# Patient Record
Sex: FEMALE | Race: WHITE | NOT HISPANIC OR LATINO | Employment: PART TIME | URBAN - METROPOLITAN AREA
[De-identification: names, ages, dates, MRNs, and addresses within clinical notes are randomized per-mention and may not be internally consistent; named-entity substitution may affect disease eponyms.]

---

## 2017-11-06 ENCOUNTER — OFFICE VISIT (OUTPATIENT)
Dept: OPTOMETRY | Facility: CLINIC | Age: 57
End: 2017-11-06
Payer: COMMERCIAL

## 2017-11-06 DIAGNOSIS — H16.202 KERATOCONJUNCTIVITIS OF LEFT EYE: Primary | ICD-10-CM

## 2017-11-06 DIAGNOSIS — H53.8 BLURRED VISION, LEFT EYE: ICD-10-CM

## 2017-11-06 PROCEDURE — 92012 INTRM OPH EXAM EST PATIENT: CPT | Mod: S$GLB,,, | Performed by: OPTOMETRIST

## 2017-11-06 PROCEDURE — 99999 PR PBB SHADOW E&M-EST. PATIENT-LVL II: CPT | Mod: PBBFAC,,, | Performed by: OPTOMETRIST

## 2017-11-07 NOTE — PROGRESS NOTES
HPI     Follow-up    Additional comments: Itchy watery pink OS since 10/30/2017, she went to   urgent care on Tuesday and was told it was an allergy-related problem -   doctor prescribed TobraDex (generic) drops for use in OS TID.  Helped a little.  States she started wearing CL again on 11/02/2017            Comments   Patient's age: 57 y.o WF   Approximate date of last eye examination:  7/6/2015  Name of last eye doctor seen: Dr Curtis  City/State: [facility]  Wears glasses? Yes     If yes, wears  Full-time or part-time?  Part time after removing   contacts  Present glasses are: Bifocal, SV Distance, SV Reading?  Bifocals  Approximate age of present glasses:  3+ years   Got new glasses following last exam, or subsequently?:  No   Any problem with VA with glasses ?  No  Wears CLs?:  Yes           If yes:              Type of CL worn:    AnchorFree 55 8.8 14.2         OD    +4.00          OS   +1.50                         Wears full-time or part-time:  Full time               Sleeps with contact lens es:  No               CL Solution used:  Opti-free               How often replace CLs:  2-3 weeks              Any problem with VA with CLs?  No              Has patient read and signed the contact lens fee information   document?  Yes  Headaches?  No  Eye pain/discomfort?  No                                                                                       Flashes?  No  Floaters?  No  Diplopia/Double vision?  No  Patient's Ocular History:         Any eye ayers rgeries? No         Any eye injury?  No         Any treatment for eye disease?  No  Family history of eye disease?    Maternal Grandmother possible glaucoma  Significant patient medical history:              1. Diabetes?  No              2. HBP?   No              3. Other (describe):  None, Healthy    ! OTC eyedrops currently using:  Rewetting drops as needed   ! Prescription eye meds currently using:  None   ! Any history of allergy/adverse reaction to any eye  meds used p   reviously?  No    ! Any history of allergy/adverse reaction to eyedrops used during prior   eye exam(s)? No    ! Any history of allergy/adverse reaction to Novacaine or similar meds?   No   ! Any history of allergy/adverse reaction to  Epinephrine or similar   meds? No           ! Patient okay with use of anesthetic eyedrops to check eye pressure?    Yes        ! Patient okay with use of eyedrops to dilate pupils today?  Yes   !  Allergies/Medications/Medical History/Fami ly History reviewed today?      Yes          Last edited by Tejas Curtis, OD on 11/6/2017  2:13 PM. (History)            Assessment /Plan     For exam results, see Encounter Report.    1. Keratoconjunctivitis of left eye     2. Blurred vision, left eye                      SCL wearer presents today with signs/symptoms suggestive of keratoconjuctivitis in the left eye only, of undetermined origin.   No signs/symptoms in the right eye.  Removed and discarded SCL worn in the left eye.  Ms. Liang already has tobramycin/dexamethasone drops previously prescribed by physician at urgent care clinic.  Remain out of CLs, at least in the left eye.  Use tobramycin/dexamethasone drops in the left eye every four hours while awake until seen again.  Return on 11/10/2017 for recheck - but call/return sooner if notes any apparent exacerbation of signs/symptoms in the interim.

## 2017-11-07 NOTE — PATIENT INSTRUCTIONS
SCL wearer presents today with signs/symptoms suggestive of keratoconjuctivitis in the left eye only, of undetermined origin.   No signs/symptoms in the right eye.  Removed and discarded SCL worn in the left eye.  Ms. Liang already has tobramycin/dexamethasone drops previously prescribed by physician at urgent care clinic.  Remain out of CLs, at least in the left eye.  Use tobramycin/dexamethasone drops in the left eye every four hours while awake until seen again.  Return on 11/10/2017 for recheck - but call/return sooner if notes any apparent exacerbation of signs/symptoms in the interim.

## 2017-11-10 ENCOUNTER — OFFICE VISIT (OUTPATIENT)
Dept: OPTOMETRY | Facility: CLINIC | Age: 57
End: 2017-11-10

## 2017-11-10 DIAGNOSIS — H16.202 KERATOCONJUNCTIVITIS OF LEFT EYE: Primary | ICD-10-CM

## 2017-11-10 PROCEDURE — 99499 UNLISTED E&M SERVICE: CPT | Mod: S$GLB,,, | Performed by: OPTOMETRIST

## 2017-11-10 RX ORDER — TOBRAMYCIN AND DEXAMETHASONE 3; 1 MG/ML; MG/ML
1-2 SUSPENSION/ DROPS OPHTHALMIC
Qty: 5 ML | Refills: 0 | Status: SHIPPED | OUTPATIENT
Start: 2017-11-10 | End: 2017-11-20

## 2017-11-10 NOTE — PATIENT INSTRUCTIONS
Recent diagnosis of keratoconjunctivitis of the left eye in SCL wearer.  Signs/symptoms improving, but central corneal changes persist, with secondary decrease in VA in the left eye.  No problem noted in the right eye.  Remain out of CLs  Continue tobramycin/dexamethasone drops in the left eye (every four hours while awake).    Recheck in one week - or prior if she notes any exacerbation of signs/symptoms in the interim.

## 2017-11-12 NOTE — PROGRESS NOTES
HPI     Eye Problem    Additional comments: Follow up on keratoconjuctivitis of the left eye.  States that signs/symptoms much improved.  No longer symptomatic.   Using tobramycin/dexamethasone drops four times per day since last visit.  Has not worn CLs.            Comments   Patient in for progress check.    Being followed for (diagnosis):  Follow up on keratoconjuncitivitis in   left eye    Date last seen:  11/06/2017    Doctor last seen:  Dr. Curtis    Prescribed eye medications(s) using:  Tobramycin/dexatmethasone ophthalmic   solution four times per day in the left eye    OTC eye medication(s) using:  no    Signs/symptoms of condition resolved/better/stable/worse?:  Much better -   asymptomatic     Allergies/Medications reviewed today?  yes               Last edited by Tejas Curtis, OD on 11/10/2017 12:55 PM. (History)            Assessment /Plan     For exam results, see Encounter Report.    1. Keratoconjunctivitis of left eye  tobramycin-dexamethasone 0.3-0.1% (TOBRADEX) 0.3-0.1 % DrpS                  Recent diagnosis of keratoconjunctivitis of the left eye in SCL wearer.  Signs/symptoms improving, but central corneal changes persist, with secondary decrease in VA in the left eye.  No problem noted in the right eye.  Remain out of CLs  Continue tobramycin/dexamethasone drops in the left eye (every four hours while awake).    Recheck in one week - or prior if she notes any exacerbation of signs/symptoms in the interim.

## 2017-11-17 ENCOUNTER — OFFICE VISIT (OUTPATIENT)
Dept: OPTOMETRY | Facility: CLINIC | Age: 57
End: 2017-11-17
Payer: COMMERCIAL

## 2017-11-17 DIAGNOSIS — H16.202 KERATOCONJUNCTIVITIS OF LEFT EYE: Primary | ICD-10-CM

## 2017-11-17 PROCEDURE — 99499 UNLISTED E&M SERVICE: CPT | Mod: S$GLB,,, | Performed by: OPTOMETRIST

## 2017-11-17 PROCEDURE — 99999 PR PBB SHADOW E&M-EST. PATIENT-LVL II: CPT | Mod: PBBFAC,,, | Performed by: OPTOMETRIST

## 2017-11-17 NOTE — PROGRESS NOTES
"HPI     Eye Problem    Additional comments: Follow up on keratoconjunctivitis in OS.  Signs and symptoms much improved.  Needs updated spectacle lens Rx, as she will be without CLs for a while.            Comments   Patient in for progress check     Being followed for (diagnosis):   Keratoconjunctivitis in OS    Date last seen:  11/10/2017    Doctor last seen:  Dr. Curtis    Prescribed eye medications(s) using:  Tobramycin/dexamethasone    OTC eye medication(s) using:  none    Signs/symptoms of condition resolved/better/stable/worse?:  Much better   per patient.    Allergies/Medications reviewed today?  Yes    PD 62/58  Reading distance = 14.5"                Last edited by Tejas Curtis, OD on 11/17/2017 12:30 PM. (History)            Assessment /Plan     For exam results, see Encounter Report.    1. Keratoconjunctivitis of left eye                Recent diagnosis of keratoconjunctivitis of the left eye in SCL wearer.  Signs/symptoms much improved with only minimal superficial corneal changes in left eye.  No problem noted in the right eye.  Refraction done.    Hyperopia with slight astigmatism in the right eye, and hyperopia in left eye.  Presbyopia consistent with age.  New spectacle lens Rx issued.   Remain out of CLs  Continue tobramycin/dexamethasone drops in the left eye, but reduce usage to twice per day until out.   Recheck 11/30/2017 - or prior if she notes any exacerbation of signs/symptoms in the interim.            "

## 2017-11-30 ENCOUNTER — OFFICE VISIT (OUTPATIENT)
Dept: OPTOMETRY | Facility: CLINIC | Age: 57
End: 2017-11-30
Payer: COMMERCIAL

## 2017-11-30 DIAGNOSIS — Z13.5 SCREENING FOR EYE CONDITION: ICD-10-CM

## 2017-11-30 DIAGNOSIS — H16.9 KERATITIS: Primary | ICD-10-CM

## 2017-11-30 PROCEDURE — 99999 PR PBB SHADOW E&M-EST. PATIENT-LVL II: CPT | Mod: PBBFAC,,, | Performed by: OPTOMETRIST

## 2017-11-30 PROCEDURE — 92014 COMPRE OPH EXAM EST PT 1/>: CPT | Mod: S$GLB,,, | Performed by: OPTOMETRIST

## 2017-11-30 RX ORDER — PREDNISOLONE ACETATE 10 MG/ML
1 SUSPENSION/ DROPS OPHTHALMIC 4 TIMES DAILY
Qty: 5 ML | Refills: 0 | Status: SHIPPED | OUTPATIENT
Start: 2017-11-30 | End: 2018-12-10

## 2017-11-30 NOTE — PATIENT INSTRUCTIONS
Persistent keratitis of left eye, in soft contact lens wearer.  Mrs. Liang no longer using tobramycin/dexamethesone drops previously prescribed on the left eye.  Only mildly symptomatic (once within the last few days, while cutting grass).    VA with last refraction (done last visit) very satisfactory in each eye.   Remain out of CLs until cornea of left eye clear.   When okay to resume CL wear, Mrs. Liang desires to switch to daily disposable SCLs.    Prescribed prednisolone acetate ophthalmic suspension for use in the left eye every four hours while awake until seen again.  Return on 12 /07/2017 for recheck.

## 2017-11-30 NOTE — PROGRESS NOTES
"HPI     Eye Problem    Additional comments: Follow up on recent history of keratoconjunctivits   in the left eye.  No longer using medication, and both eyes feel fine.    Refraction done on last visit.  Has not yet gotten new glasses.  Anxious   to resume CL wear.            Comments   Patient in for progress check.    Being followed for (diagnosis):   Keratoconjunctivitis of left eye.   SCL   wearer, but has not worn CLs in several weeks.    Date last seen:  11/17/2017 refraction done then, and new spectacle lens   Rx issued then    Doctor last seen:  Dr. Curtis    Prescribed eye medications(s) using: none    OTC eye medication(s) using:  none    Signs/symptoms of condition resolved/better/stable/worse?:  Better - both   eyes feel fine, and anxious to resume CL wear, if possible.     Allergies/Medications reviewed today?  yes      Last refraction done 11/17/2017:     OD  +1.75 +0.50 x 140   20/20-1     OS  +1.50 DS   20/20        Binoc subj at 14.5" = +2.50 D add OU     Placed above MR into phoropter.  Distance VA with Rx:  OD  20/20-2                                      OS   20/25-1+3    Last CL Rx written for monovision Proclear DW SCLs:  Right for near and   left for distance  However, Mrs. Liang would like to switch to monovision  daily   disposable SCLs                                                                         Last edited by Tejas Curtis, OD on 11/30/2017  8:05 AM. (History)            Assessment /Plan     For exam results, see Encounter Report.    1. Keratitis     2. Screening for eye condition                  Persistent keratitis of left eye, in soft contact lens wearer.  Mrs. Liang no longer using tobramycin/dexamethesone drops previously prescribed on the left eye.  Only mildly symptomatic (once within the last few days, while cutting grass).    VA with last refraction (done last visit) very satisfactory in each eye.   Remain out of CLs until cornea of left eye clear. "   When okay to resume CL wear, Mrs. Liang desires to switch to daily disposable SCLs.    Prescribed prednisolone acetate ophthalmic suspension for use in the left eye every four hours while awake until seen again.  Return on 12 /07/2017 for recheck.

## 2017-12-07 ENCOUNTER — OFFICE VISIT (OUTPATIENT)
Dept: OPTOMETRY | Facility: CLINIC | Age: 57
End: 2017-12-07
Payer: COMMERCIAL

## 2017-12-07 DIAGNOSIS — Z46.0 ENCOUNTER FOR FITTING OR ADJUSTMENT OF SPECTACLES OR CONTACT LENSES: Primary | ICD-10-CM

## 2017-12-07 PROCEDURE — 99499 UNLISTED E&M SERVICE: CPT | Mod: S$GLB,,, | Performed by: OPTOMETRIST

## 2017-12-07 NOTE — PATIENT INSTRUCTIONS
Recent history of keratitis in the left eye, new resolved.   Mrs. Liang would like to resume CL wear, and would like to switch to daily disposable SCLs.   Good lens fit achieved with Dailies Total 1 SCLs.   Dispensed trial supply of Dailies Total 1 SCLs:     OD  8.5   14.1   +5.00  (near)     OS  8.5   14.1   +1.75   (distance)  Daily wear - replace daily.  Return 12/18/2017 for CL follow-up - or prior if any problems noted in the interim.    Taper use of Prednisolone Acetate 1% ophthalmic suspension in the left eye:  One drop twice per day for seven days - thirty minutes prior to the insertion of CLs in the morning and following CL removal in the evening.  Then, one drop per day in the evening following removal of CLs in the evening for seven days.

## 2017-12-07 NOTE — PROGRESS NOTES
HPI     Contact Lens Follow Up    Additional comments: Patient in for follow-up on keratitis in OS, and   possible contact lens refit if left cornea clear.  Interested in switch to daily disposable SCLs (monovision)            Comments   Patient in for progress check.    Being followed for (diagnosis):   Keratitis OS    Doctor last seen:  Dr. Curtis    Prescribed eye medications(s) using:  Prednisolone Acetate 1% qid OS    OTC eye medication(s) using:  none    Signs/symptoms of condition resolved/better/stable/worse?:  Much better   per patient - left eye feels back to normal     Allergies/Medications reviewed today?  yes    Refer to CL section of record.        Last edited by Tejas Curtis, OD on 12/7/2017 10:03 AM. (History)            Assessment /Plan     For exam results, see Encounter Report.    1. Encounter for fitting or adjustment of spectacles or contact lenses                  Recent history of keratitis in the left eye, new resolved.   Mrs. Liang would like to resume CL wear, and would like to switch to daily disposable SCLs.   Good lens fit achieved with Dailies Total 1 SCLs.   Dispensed trial supply of Dailies Total 1 SCLs:     OD  8.5   14.1   +5.00  (near)     OS  8.5   14.1   +1.75   (distance)  Daily wear - replace daily.  Return 12/18/2017 for CL follow-up - or prior if any problems noted in the interim.    Taper use of Prednisolone Acetate 1% ophthalmic suspension in the left eye:  One drop twice per day for seven days - thirty minutes prior to the insertion of CLs in the morning and following CL removal in the evening.  Then, one drop per day in the evening following removal of CLs in the evening for seven days.

## 2017-12-18 ENCOUNTER — OFFICE VISIT (OUTPATIENT)
Dept: OPTOMETRY | Facility: CLINIC | Age: 57
End: 2017-12-18

## 2017-12-18 DIAGNOSIS — Z46.0 ENCOUNTER FOR FITTING OR ADJUSTMENT OF SPECTACLES OR CONTACT LENSES: Primary | ICD-10-CM

## 2017-12-18 PROCEDURE — 92310 CONTACT LENS FITTING OU: CPT | Mod: S$GLB,,, | Performed by: OPTOMETRIST

## 2017-12-18 PROCEDURE — 99499 UNLISTED E&M SERVICE: CPT | Mod: S$GLB,,, | Performed by: OPTOMETRIST

## 2017-12-18 NOTE — PROGRESS NOTES
HPI     Patient in for contact lens follow-up.    Contact lenses patient currently wearing:  OD 8.5 14.0 +5.00 ( near)                                                                        OS   8.5 14.1 +1.75 ( distance)   Dailies Total 1 Scl's     Patient's report on visual acuity with contact lenses:  Distance:                                                                                            Near     Any problems with Contact Lens comfort?  No    Contact lens wearing time (hours/per day): 12-14 hours     How long have Contact Lenses been in today?  4 hours     Does patient sleep with the contact lenses in?  No    Contact lens care system/solution used?      Last edited by Eber Quintero on 12/18/2017 10:13 AM. (History)            Assessment /Plan     For exam results, see Encounter Report.    1. Encounter for fitting or adjustment of spectacles or contact lenses                    Good contact lens fit with present trial monovision Dailies Total 1 SCLs.  Wearing CLs well in each eye.  Binoc near VA good with CLs.  Binoc dist VA satisfactory with CLs, and over-refraction indicates no need for power change.  New CL Rx issued for monovision Dailies Total 1 SCLs (right for near, and left for distance).  Daily wear only - single use - replace daily.  Return for recheck in six months - or prior if any problems noted in the interim.

## 2017-12-18 NOTE — PATIENT INSTRUCTIONS
Good contact lens fit with present trial monovision Dailies Total 1 SCLs.  Wearing CLs well in each eye.  Binoc near VA good with CLs.  Binoc dist VA satisfactory with CLs, and over-refraction indicates no need for power change.  New CL Rx issued for monovision Dailies Total 1 SCLs (right for near, and left for distance).  Daily wear only - single use - replace daily.  Return for recheck in six months - or prior if any problems noted in the interim.

## 2019-02-14 ENCOUNTER — OFFICE VISIT (OUTPATIENT)
Dept: OPTOMETRY | Facility: CLINIC | Age: 59
End: 2019-02-14

## 2019-02-14 ENCOUNTER — OFFICE VISIT (OUTPATIENT)
Dept: OPTOMETRY | Facility: CLINIC | Age: 59
End: 2019-02-14
Payer: COMMERCIAL

## 2019-02-14 DIAGNOSIS — H52.01 HYPEROPIA OF RIGHT EYE WITH ASTIGMATISM: ICD-10-CM

## 2019-02-14 DIAGNOSIS — H52.02 HYPERMETROPIA OF LEFT EYE: ICD-10-CM

## 2019-02-14 DIAGNOSIS — Z46.0 ENCOUNTER FOR FITTING OR ADJUSTMENT OF SPECTACLES OR CONTACT LENSES: Primary | ICD-10-CM

## 2019-02-14 DIAGNOSIS — H52.201 HYPEROPIA OF RIGHT EYE WITH ASTIGMATISM: ICD-10-CM

## 2019-02-14 DIAGNOSIS — Z13.5 SCREENING FOR EYE CONDITION: ICD-10-CM

## 2019-02-14 DIAGNOSIS — H17.9 CORNEAL SCARRING: Primary | ICD-10-CM

## 2019-02-14 DIAGNOSIS — H52.4 PRESBYOPIA OF BOTH EYES: ICD-10-CM

## 2019-02-14 PROCEDURE — 92310 PR CONTACT LENS FITTING (NO CHANGE): ICD-10-PCS | Mod: ,,, | Performed by: OPTOMETRIST

## 2019-02-14 PROCEDURE — 99999 PR PBB SHADOW E&M-EST. PATIENT-LVL II: CPT | Mod: PBBFAC,,, | Performed by: OPTOMETRIST

## 2019-02-14 PROCEDURE — 92015 DETERMINE REFRACTIVE STATE: CPT | Mod: S$GLB,,, | Performed by: OPTOMETRIST

## 2019-02-14 PROCEDURE — 99999 PR PBB SHADOW E&M-EST. PATIENT-LVL II: ICD-10-PCS | Mod: PBBFAC,,, | Performed by: OPTOMETRIST

## 2019-02-14 PROCEDURE — 92014 PR EYE EXAM, EST PATIENT,COMPREHESV: ICD-10-PCS | Mod: S$GLB,,, | Performed by: OPTOMETRIST

## 2019-02-14 PROCEDURE — 99499 NO LOS: ICD-10-PCS | Mod: ,,, | Performed by: OPTOMETRIST

## 2019-02-14 PROCEDURE — 92015 PR REFRACTION: ICD-10-PCS | Mod: S$GLB,,, | Performed by: OPTOMETRIST

## 2019-02-14 PROCEDURE — 92014 COMPRE OPH EXAM EST PT 1/>: CPT | Mod: S$GLB,,, | Performed by: OPTOMETRIST

## 2019-02-14 PROCEDURE — 99499 UNLISTED E&M SERVICE: CPT | Mod: ,,, | Performed by: OPTOMETRIST

## 2019-02-14 PROCEDURE — 92310 CONTACT LENS FITTING OU: CPT | Mod: ,,, | Performed by: OPTOMETRIST

## 2019-02-14 NOTE — PROGRESS NOTES
HPI     Concerns About Ocular Health      Additional comments: General eye examination and refraction, and CL   follow-up;  Wears monovision SCLs:  OD near  OS distance.  No problems noted.                  Comments       Patient's age: 58 y.o WF   Approximate date of last eye examination:  11/30/2017  Name of last eye doctor seen: Dr Curtis  City/State: [facility]  Wears glasses? Yes     If yes, wears  Full-time or part-time?  Part time after removing   contacts  Present glasses are: Bifocal, SV Distance, SV Reading?  Bifocals  Approximate age of present glasses:  1 year   Got new glasses following last exam, or subsequently?:  No   Any problem with VA with glasses ?  No  Wears CLs?:  Yes           If yes:              Type of CL worn:    DT1's         OD    +5.00          OS   +1.75                        Wears full-time or part-time:  Full time               Sleeps with contact lens es:  No               CL Solution used:  Opti-free               How often replace CLs:  Dailies              Any problem with VA with CLs?  No              Has patient read and signed the contact lens fee information   document?  Yes  Headaches?  No  Eye pain/discomfort?  No                                                                                       Flashes?  No  Floaters?  No  Diplopia/Double vision?  No  Patient's Ocular History:         Any eye ayers rgeries? No         Any eye injury?  No         Any treatment for eye disease?  No  Family history of eye disease?    Maternal Grandmother possible glaucoma  Significant patient medical history:              1. Diabetes?  No              2. HBP?   No              3. Other (describe):  None, Healthy    ! OTC eyedrops currently using:  Rewetting drops as needed   ! Prescription eye meds currently using:  None   ! Any history of allergy/adverse reaction to any eye meds used p   reviously?  No    ! Any history of allergy/adverse reaction to eyedrops used during prior   eye exam(s)? No  "   ! Any history of allergy/adverse reaction to Novacaine or similar meds?   No   ! Any history of allergy/adverse reaction to  Epinephrine or similar   meds? No           ! Patient okay with use of anesthetic eyedrops to check eye pressure?    Yes        ! Patient okay with use of eyedrops to dilate pupils today?  Yes   !  Allergies/Medications/Medical History/Fami ly History reviewed today?      Yes    PD 62/58  Reading distance 14.5"                                                                            Last edited by Tejas Curtis, OD on 2/14/2019  1:49 PM. (History)            Assessment /Plan     For exam results, see Encounter Report.    1. Corneal scarring     2. Hyperopia of right eye with astigmatism     3. Hypermetropia of left eye     4. Presbyopia of both eyes     5. Screening for eye condition                  Corneal scarring in the left eye (only) presumably secondary to past history of keratitis.  No evidence of active keratitis today.  Otherwise, ocular health appears good in both eyes.    Hyperopia with astigmatism in the right eye, and hyperopia in the left eye.  Good best-corrected VA in each eye.  Presbyopia consistent with age.  New spectacle lens Rx issued for use in lieu of CLs.    Good contact lens fit in each eye with present Dailies Total 1 SCLs.  Happy with monovision effect (right for near and left for distance).  Power of the right lens fine as is.  Showing need for only minor power change (reduction) in the left lens, per over-refraction done today.  New CL Rx issued.    Recheck in one year - or prior if any problems noted in the interim.         "

## 2019-02-14 NOTE — PROGRESS NOTES
HPI     Patient's age: 58 y.o WF   Approximate date of last eye examination:  11/30/2017  Name of last eye doctor seen: Dr Curtis  City/State: [facility]  Wears glasses? Yes     If yes, wears  Full-time or part-time?  Part time after removing   contacts  Present glasses are: Bifocal, SV Distance, SV Reading?  Bifocals  Approximate age of present glasses:  1 year   Got new glasses following last exam, or subsequently?:  No   Any problem with VA with glasses ?  No  Wears CLs?:  Yes           If yes:              Type of CL worn:    DT1's         OD    +5.00          OS   +1.75                        Wears full-time or part-time:  Full time               Sleeps with contact lens es:  No               CL Solution used:  Opti-free               How often replace CLs:  Neena              Any problem with VA with CLs?  No              Has patient read and signed the contact lens fee information   document?  Yes  Headaches?  No  Eye pain/discomfort?  No                                                                                       Flashes?  No  Floaters?  No  Diplopia/Double vision?  No  Patient's Ocular History:         Any eye ayers rgeries? No         Any eye injury?  No         Any treatment for eye disease?  No  Family history of eye disease?    Maternal Grandmother possible glaucoma  Significant patient medical history:              1. Diabetes?  No              2. HBP?   No              3. Other (describe):  None, Healthy    ! OTC eyedrops currently using:  Rewetting drops as needed   ! Prescription eye meds currently using:  None   ! Any history of allergy/adverse reaction to any eye meds used p   reviously?  No    ! Any history of allergy/adverse reaction to eyedrops used during prior   eye exam(s)? No    ! Any history of allergy/adverse reaction to Novacaine or similar meds?   No   ! Any history of allergy/adverse reaction to  Epinephrine or similar   meds? No           ! Patient okay with use of anesthetic  eyedrops to check eye pressure?    Yes        ! Patient okay with use of eyedrops to dilate pupils today?  Yes   !  Allergies/Medications/Medical History/Fami ly History reviewed today?      Yes       Last edited by Mil Bacon, PCT on 2/14/2019  1:14 PM. (History)            Assessment /Plan     For exam results, see Encounter Report.    1. Encounter for fitting or adjustment of spectacles or contact lenses                Corneal scarring in the left eye (only) presumably secondary to past history of keratitis.  No evidence of active keratitis today.  Otherwise, ocular health appears good in both eyes.    Hyperopia with astigmatism in the right eye, and hyperopia in the left eye.  Good best-corrected VA in each eye.  Presbyopia consistent with age.  New spectacle lens Rx issued for use in lieu of CLs.    Good contact lens fit in each eye with present Dailies Total 1 SCLs.  Happy with monovision effect (right for near and left for distance).  Power of the right lens fine as is.  Showing need for only minor power change (reduction) in the left lens, per over-refraction done today.  New CL Rx issued.    Recheck in one year - or prior if any problems noted in the interim.

## 2019-02-14 NOTE — PATIENT INSTRUCTIONS
Corneal scarring in the left eye (only) presumably secondary to past history of keratitis.  No evidence of active keratitis today.  Otherwise, ocular health appears good in both eyes.    Hyperopia with astigmatism in the right eye, and hyperopia in the left eye.  Good best-corrected VA in each eye.  Presbyopia consistent with age.  New spectacle lens Rx issued for use in lieu of CLs.    Good contact lens fit in each eye with present Dailies Total 1 SCLs.  Happy with monovision effect (right for near and left for distance).  Power of the right lens fine as is.  Showing need for only minor power change (reduction) in the left lens, per over-refraction done today.  New CL Rx issued.    Recheck in one year - or prior if any problems noted in the interim.

## 2020-04-06 ENCOUNTER — TELEPHONE (OUTPATIENT)
Dept: OPTOMETRY | Facility: CLINIC | Age: 60
End: 2020-04-06

## 2020-07-01 ENCOUNTER — OFFICE VISIT (OUTPATIENT)
Dept: OPTOMETRY | Facility: CLINIC | Age: 60
End: 2020-07-01
Payer: COMMERCIAL

## 2020-07-01 DIAGNOSIS — H52.03 HYPEROPIA WITH PRESBYOPIA OF BOTH EYES: ICD-10-CM

## 2020-07-01 DIAGNOSIS — H52.4 HYPEROPIA WITH PRESBYOPIA OF BOTH EYES: ICD-10-CM

## 2020-07-01 DIAGNOSIS — H25.13 NUCLEAR SCLEROSIS, BILATERAL: Primary | ICD-10-CM

## 2020-07-01 PROCEDURE — 99999 PR PBB SHADOW E&M-EST. PATIENT-LVL III: CPT | Mod: PBBFAC,,, | Performed by: OPTOMETRIST

## 2020-07-01 PROCEDURE — 92015 PR REFRACTION: ICD-10-PCS | Mod: S$GLB,,, | Performed by: OPTOMETRIST

## 2020-07-01 PROCEDURE — 92310 PR CONTACT LENS FITTING (NO CHANGE): ICD-10-PCS | Mod: CSM,S$GLB,, | Performed by: OPTOMETRIST

## 2020-07-01 PROCEDURE — 92014 COMPRE OPH EXAM EST PT 1/>: CPT | Mod: S$GLB,,, | Performed by: OPTOMETRIST

## 2020-07-01 PROCEDURE — 92310 CONTACT LENS FITTING OU: CPT | Mod: CSM,S$GLB,, | Performed by: OPTOMETRIST

## 2020-07-01 PROCEDURE — 99999 PR PBB SHADOW E&M-EST. PATIENT-LVL III: ICD-10-PCS | Mod: PBBFAC,,, | Performed by: OPTOMETRIST

## 2020-07-01 PROCEDURE — 92014 PR EYE EXAM, EST PATIENT,COMPREHESV: ICD-10-PCS | Mod: S$GLB,,, | Performed by: OPTOMETRIST

## 2020-07-01 PROCEDURE — 92015 DETERMINE REFRACTIVE STATE: CPT | Mod: S$GLB,,, | Performed by: OPTOMETRIST

## 2020-07-01 NOTE — PROGRESS NOTES
HPI     [facility] - 02/14/19 Dr. Curtis  Annual eye exam and contact fit   No vision issues or compalints    Last edited by SUSIE Mays on 7/1/2020 10:17 AM. (History)            Assessment /Plan     For exam results, see Encounter Report.    Nuclear sclerosis, bilateral  -Educated patient on presence of cataracts at today's exam, monitor at annual dilated fundus exam. 8+ years surgical estimate.    Hyperopia with presbyopia of both eyes  Eyeglass Final Rx     Eyeglass Final Rx       Sphere Cylinder Axis Dist VA Add    Right +1.75 +0.50 155 20/20 +2.25    Left +1.75 Sphere  20/20 +2.25    Type: PAL    Expiration Date: 7/2/2021              Contact Lens Final Rx     Final Contact Lens Rx       Brand Base Curve Diameter Sphere Cylinder Addl. Specs    Right Dailies Total 1 8.50 14.1 +5.00 Sphere near    Left Dailies Total 1 8.50 14.1 +1.75 Sphere distance    Expiration Date: 7/2/2021    Replacement: Daily    Wearing Schedule: Daily wear                  RTC 1 yr

## 2020-09-02 NOTE — PATIENT INSTRUCTIONS
Recent diagnosis of keratoconjunctivitis of the left eye in SCL wearer.  Signs/symptoms much improved with only minimal superficial corneal changes in left eye.  No problem noted in the right eye.  Refraction done.    Hyperopia with slight astigmatism in the right eye, and hyperopia in left eye.  Presbyopia consistent with age.  New spectacle lens Rx issued.   Remain out of CLs  Continue tobramycin/dexamethasone drops in the left eye, but reduce usage to twice per day until out.   Recheck 11/30/2017 - or prior if she notes any exacerbation of signs/symptoms in the interim.      
No/Not applicable

## 2021-11-04 ENCOUNTER — OFFICE VISIT (OUTPATIENT)
Dept: OPTOMETRY | Facility: CLINIC | Age: 61
End: 2021-11-04
Payer: COMMERCIAL

## 2021-11-04 DIAGNOSIS — Z13.5 GLAUCOMA SCREENING: ICD-10-CM

## 2021-11-04 DIAGNOSIS — H25.13 NUCLEAR SCLEROSIS, BILATERAL: Primary | ICD-10-CM

## 2021-11-04 DIAGNOSIS — H52.4 HYPEROPIA WITH ASTIGMATISM AND PRESBYOPIA, BILATERAL: ICD-10-CM

## 2021-11-04 DIAGNOSIS — H52.203 HYPEROPIA WITH ASTIGMATISM AND PRESBYOPIA, BILATERAL: ICD-10-CM

## 2021-11-04 DIAGNOSIS — H52.03 HYPEROPIA WITH ASTIGMATISM AND PRESBYOPIA, BILATERAL: ICD-10-CM

## 2021-11-04 DIAGNOSIS — Z46.0 CONTACT LENS/GLASSES FITTING: Primary | ICD-10-CM

## 2021-11-04 DIAGNOSIS — Z46.0 CONTACT LENS/GLASSES FITTING: ICD-10-CM

## 2021-11-04 DIAGNOSIS — H43.393 VITREOUS FLOATERS, BILATERAL: ICD-10-CM

## 2021-11-04 DIAGNOSIS — H17.9 CORNEAL SCAR, LEFT EYE: ICD-10-CM

## 2021-11-04 PROBLEM — Z97.3 WEARS CONTACT LENSES: Status: ACTIVE | Noted: 2021-11-04

## 2021-11-04 PROCEDURE — 99499 NO LOS: ICD-10-PCS | Mod: S$GLB,,, | Performed by: OPTOMETRIST

## 2021-11-04 PROCEDURE — 92014 COMPRE OPH EXAM EST PT 1/>: CPT | Mod: S$GLB,,, | Performed by: OPTOMETRIST

## 2021-11-04 PROCEDURE — 92310 CONTACT LENS FITTING OU: CPT | Mod: CSM,,, | Performed by: OPTOMETRIST

## 2021-11-04 PROCEDURE — 1159F MED LIST DOCD IN RCRD: CPT | Mod: CPTII,S$GLB,, | Performed by: OPTOMETRIST

## 2021-11-04 PROCEDURE — 92015 DETERMINE REFRACTIVE STATE: CPT | Mod: S$GLB,,, | Performed by: OPTOMETRIST

## 2021-11-04 PROCEDURE — 92015 PR REFRACTION: ICD-10-PCS | Mod: S$GLB,,, | Performed by: OPTOMETRIST

## 2021-11-04 PROCEDURE — 99999 PR PBB SHADOW E&M-EST. PATIENT-LVL II: ICD-10-PCS | Mod: PBBFAC,,, | Performed by: OPTOMETRIST

## 2021-11-04 PROCEDURE — 92310 PR CONTACT LENS FITTING (NO CHANGE): ICD-10-PCS | Mod: CSM,,, | Performed by: OPTOMETRIST

## 2021-11-04 PROCEDURE — 99999 PR PBB SHADOW E&M-EST. PATIENT-LVL II: CPT | Mod: PBBFAC,,, | Performed by: OPTOMETRIST

## 2021-11-04 PROCEDURE — 99499 UNLISTED E&M SERVICE: CPT | Mod: S$GLB,,, | Performed by: OPTOMETRIST

## 2021-11-04 PROCEDURE — 1159F PR MEDICATION LIST DOCUMENTED IN MEDICAL RECORD: ICD-10-PCS | Mod: CPTII,S$GLB,, | Performed by: OPTOMETRIST

## 2021-11-04 PROCEDURE — 92014 PR EYE EXAM, EST PATIENT,COMPREHESV: ICD-10-PCS | Mod: S$GLB,,, | Performed by: OPTOMETRIST

## 2023-02-09 ENCOUNTER — OFFICE VISIT (OUTPATIENT)
Dept: OPTOMETRY | Facility: CLINIC | Age: 63
End: 2023-02-09
Payer: MEDICARE

## 2023-02-09 DIAGNOSIS — H52.03 HYPEROPIA WITH ASTIGMATISM AND PRESBYOPIA, BILATERAL: ICD-10-CM

## 2023-02-09 DIAGNOSIS — H17.9 CORNEAL SCAR, LEFT EYE: ICD-10-CM

## 2023-02-09 DIAGNOSIS — Z13.5 GLAUCOMA SCREENING: ICD-10-CM

## 2023-02-09 DIAGNOSIS — Z46.0 CONTACT LENS/GLASSES FITTING: ICD-10-CM

## 2023-02-09 DIAGNOSIS — H43.393 VITREOUS FLOATERS, BILATERAL: ICD-10-CM

## 2023-02-09 DIAGNOSIS — Z46.0 CONTACT LENS/GLASSES FITTING: Primary | ICD-10-CM

## 2023-02-09 DIAGNOSIS — H52.4 HYPEROPIA WITH ASTIGMATISM AND PRESBYOPIA, BILATERAL: ICD-10-CM

## 2023-02-09 DIAGNOSIS — H25.13 NUCLEAR SCLEROSIS, BILATERAL: Primary | ICD-10-CM

## 2023-02-09 DIAGNOSIS — H52.203 HYPEROPIA WITH ASTIGMATISM AND PRESBYOPIA, BILATERAL: ICD-10-CM

## 2023-02-09 PROCEDURE — 99499 NO LOS: ICD-10-PCS | Mod: S$PBB,,, | Performed by: OPTOMETRIST

## 2023-02-09 PROCEDURE — 92310 PR CONTACT LENS FITTING (NO CHANGE): ICD-10-PCS | Mod: CSM,,, | Performed by: OPTOMETRIST

## 2023-02-09 PROCEDURE — 99499 UNLISTED E&M SERVICE: CPT | Mod: S$PBB,,, | Performed by: OPTOMETRIST

## 2023-02-09 PROCEDURE — 99212 OFFICE O/P EST SF 10 MIN: CPT | Mod: PBBFAC,PO | Performed by: OPTOMETRIST

## 2023-02-09 PROCEDURE — 92014 COMPRE OPH EXAM EST PT 1/>: CPT | Mod: S$PBB,,, | Performed by: OPTOMETRIST

## 2023-02-09 PROCEDURE — 92014 PR EYE EXAM, EST PATIENT,COMPREHESV: ICD-10-PCS | Mod: S$PBB,,, | Performed by: OPTOMETRIST

## 2023-02-09 PROCEDURE — 92015 PR REFRACTION: ICD-10-PCS | Mod: ,,, | Performed by: OPTOMETRIST

## 2023-02-09 PROCEDURE — 92310 CONTACT LENS FITTING OU: CPT | Mod: CSM,,, | Performed by: OPTOMETRIST

## 2023-02-09 PROCEDURE — 92015 DETERMINE REFRACTIVE STATE: CPT | Mod: ,,, | Performed by: OPTOMETRIST

## 2023-02-09 PROCEDURE — 99999 PR PBB SHADOW E&M-EST. PATIENT-LVL II: ICD-10-PCS | Mod: PBBFAC,,, | Performed by: OPTOMETRIST

## 2023-02-09 PROCEDURE — 99999 PR PBB SHADOW E&M-EST. PATIENT-LVL II: CPT | Mod: PBBFAC,,, | Performed by: OPTOMETRIST

## 2023-02-09 NOTE — PROGRESS NOTES
HPI    Dle- 11/04/2021    Pt here for routine eye exam and contact fitting. Pt sts no changes to va.   Denies flashes/floaters/eye pain. No gtts. Pt likes current brand of cls.   Last edited by Nicole Yeung MA on 2/9/2023  8:29 AM.        ROS    Positive for: Eyes  Negative for: Constitutional, Gastrointestinal, Neurological, Skin,   Genitourinary, Musculoskeletal, HENT, Endocrine, Cardiovascular,   Respiratory, Psychiatric, Allergic/Imm, Heme/Lymph  Last edited by ELVA Amor, OD on 2/9/2023  8:57 AM.        Assessment /Plan     For exam results, see Encounter Report.    Nuclear sclerosis, bilateral    Vitreous floaters, bilateral    Corneal scar, left eye    Glaucoma screening    Hyperopia with astigmatism and presbyopia, bilateral    Contact lens/glasses fitting         1. Early NS changes, not vis sig for consult   Gave info, cautions night driving--CE 2-3yrs +    2. RD precautions given and reviewed. Patient knows to call/ message if any further changes in symptoms occur.  3. Longstanding / old ulcer---not in vis axis--monitor     4. Not suspect  5. Updated specs rx, gave copy, fill prn  6. Updated clrx, no changes, continue with daily dispose  Happy w/ monovision, cautions driving lb at night     DAILY WEAR CONTACT LENSES  Continue with Daily Wear of contact lenses, up to all waking hours.  Continue with approved contact lens disinfection / rewetting drops as discussed.  Dispose of lenses daily.  Stop wearing your lenses and call our office if redness, blurred vision, or pain persists more than 12 hours.  We recommend an annual eye exam for contact lens patients.        Discussed and educated patient on current findings /plan.  RTC 1 year, prn if any changes / issues                     ivana/yes

## 2023-02-09 NOTE — PATIENT INSTRUCTIONS
"DRY EYES -- BURNING OR THEODORE SYMPTOMS:  Use Over The Counter artificial tears as needed for dry eye symptoms.   Some common brands include:  Systane, Optive, Refresh, and Thera-Tears.  These drops can be used as frequently as desired, but may be most helpful use during long periods of concentrated work.  For example, reading / working at the computer. Start with 3-4x per day.     Nighttime Ophthalmic gel or ointments are available: Refresh PM, Genteal, and Lacrilube.    Avoid drops that "get redness out" (Visine, Murine, Clear Eyes), as these may contain medication that could further irritate the eyes, especially with chronic use.    ALLERGY EYES -- ITCHING SYMPTOMS:  Over the counter medications include--Pataday, Zaditor, and Alaway.  Use as directed 1-2 drops daily for symptoms of itching / watering eyes.  These drops will not help for dry eye or exposure symptoms.    REDNESS RELIEF:  Lumify---is a good redness reliever that will not cause irritation if used chronically.        FLASHES / FLOATERS / POSTERIOR VITREOUS DETACHMENT    Call the clinic if you have any further changes in symptoms.  Including:  Increased numbers of floaters or flashing lights, dimness or darkness that moves through or stays constant in your vision, or any pain in the eye (s).    You may sometimes see small specks or clouds moving in your field of vision.  They are called FLOATERS.  You can often see them when looking at a plain background, like a blank wall or blue anneliese.  Floaters are actually tiny clumps of gel or cells inside the VITREOUS, the clear jelly-like fluid that fills the inside of your eye.    While these objects look like they are in front of your eye, they are actually floating inside.  What you see are the shadows they cast on the RETINA, the nerve layer at the back of the eye that senses light and allows you to see.      POSTERIOR VITREOUS DETACHMENT    The appearance of new floaters may be alarming.  If you suddenly " develop new floaters, you should contact your eye care professional  right away.    The retina can tear if the shrinking vitreous pulls away from the wall of the eye.  This sometimes causes a small amount of bleeding in the eye that may appear as new floaters.    A torn retina is always a serious problem, since it can lead to a retinal detachment.  You should see your eye care professional as soon as possible if:    even one new floater appears suddenly;  you see sudden flashes of light;  you notice other symptoms, like the loss of side vision, or a curtain closes down in your vision        POSTERIOR VITREOUS DETACHMENT is more common for people who:    are nearsighted;  have had cataract surgery;  have had YAG laser surgery of the eye;  have had inflammation inside the eye;  are over age 60.      While some floaters may remain visible, many of them will fade over time and become less noticeable.  Even if you've had some floaters for years, you should have your eyes checked as soon as possible if you notice new ones.    FLASHING LIGHTS    When the vitreous gel rubs or pulls on the retina, you may see what look like flashing lights or lightning streaks.  These flashes can appear off and on for several weeks or months.      Some people experience flashes of light that appear as jagged lines or heat waves in both eyes, lasting 10-20 minutes.  These flashes are caused by a spasm of blood vessels in the brain, which is called a migraine.    If a headache follows these flashes, it's called a migraine headache.  If   no headache occurs, these flashes are called Ophthalmic or Ocular Migraine.           Early Cataracts--not visually significant for surgery consultation.    What Are Cataracts?  A clear lens in the eye focuses light. This lets the eye see images sharply. With age, the lens slowly becomes cloudy. The cloudy lens is a cataract. A cataract scatters light and makes it hard for the eye to focus. Cataracts often  form in both eyes. But one lens may cloud faster than the other.      The Aging of Your Lens    Your lens may cloud so slowly that you don`t notice any vision changes at first. But as the cataract gets worse, the eye has a harder time focusing. In early stages, glasses may help you see better. As the lens gets cloudier, your doctor may recommend surgery to restore your vision.

## 2023-02-20 ENCOUNTER — PATIENT MESSAGE (OUTPATIENT)
Dept: OPTOMETRY | Facility: CLINIC | Age: 63
End: 2023-02-20
Payer: MEDICARE

## 2024-03-07 ENCOUNTER — OFFICE VISIT (OUTPATIENT)
Dept: OPTOMETRY | Facility: CLINIC | Age: 64
End: 2024-03-07
Payer: MEDICARE

## 2024-03-07 DIAGNOSIS — H43.393 VITREOUS FLOATERS, BILATERAL: ICD-10-CM

## 2024-03-07 DIAGNOSIS — Z13.5 GLAUCOMA SCREENING: ICD-10-CM

## 2024-03-07 DIAGNOSIS — H52.4 HYPEROPIA WITH ASTIGMATISM AND PRESBYOPIA, BILATERAL: ICD-10-CM

## 2024-03-07 DIAGNOSIS — H52.203 HYPEROPIA WITH ASTIGMATISM AND PRESBYOPIA, BILATERAL: ICD-10-CM

## 2024-03-07 DIAGNOSIS — H25.13 NUCLEAR SCLEROSIS, BILATERAL: Primary | ICD-10-CM

## 2024-03-07 DIAGNOSIS — H17.9 CORNEAL SCAR, LEFT EYE: ICD-10-CM

## 2024-03-07 DIAGNOSIS — H52.03 HYPEROPIA WITH ASTIGMATISM AND PRESBYOPIA, BILATERAL: ICD-10-CM

## 2024-03-07 DIAGNOSIS — Z46.0 CONTACT LENS/GLASSES FITTING: Primary | ICD-10-CM

## 2024-03-07 DIAGNOSIS — Z46.0 CONTACT LENS/GLASSES FITTING: ICD-10-CM

## 2024-03-07 PROCEDURE — 99999 PR PBB SHADOW E&M-EST. PATIENT-LVL II: CPT | Mod: PBBFAC,,, | Performed by: OPTOMETRIST

## 2024-03-07 PROCEDURE — 99499 UNLISTED E&M SERVICE: CPT | Mod: ,,, | Performed by: OPTOMETRIST

## 2024-03-07 PROCEDURE — 99212 OFFICE O/P EST SF 10 MIN: CPT | Mod: PBBFAC,PO | Performed by: OPTOMETRIST

## 2024-03-07 PROCEDURE — 92310 CONTACT LENS FITTING OU: CPT | Mod: CSM,S$GLB,, | Performed by: OPTOMETRIST

## 2024-03-07 PROCEDURE — 92015 DETERMINE REFRACTIVE STATE: CPT | Mod: ,,, | Performed by: OPTOMETRIST

## 2024-03-07 PROCEDURE — 92012 INTRM OPH EXAM EST PATIENT: CPT | Mod: S$PBB,,, | Performed by: OPTOMETRIST

## 2024-03-07 NOTE — PROGRESS NOTES
HPI    Routine eye exam-dle-2/23    Pt denies any blurred va. Happy with current contacts. Denies any flashes   or floaters. No gtts.   Last edited by Trice Parks on 3/7/2024  8:47 AM.            Assessment /Plan     For exam results, see Encounter Report.    Nuclear sclerosis, bilateral    Vitreous floaters, bilateral    Corneal scar, left eye    Glaucoma screening    Hyperopia with astigmatism and presbyopia, bilateral    Contact lens/glasses fitting      Early NS changes, not vis sig for consult, gave info, cautions driving   RD precautions given and reviewed. Patient knows to call/ message if any further changes in symptoms occur.  Longstanding old K scar, not vis sig ---monitor   Not suspect   Updated specs rx, gave copy   Updated clrx, gave copy no changes ---happy w/ current monovision OD near     Defers DFE --OPTOS 3/2024 --normal findings     DAILY WEAR CONTACT LENSES  Continue with Daily Wear of contact lenses, up to all waking hours.  Continue with approved contact lens disinfection / rewetting drops as discussed.  Dispose of lenses daily.   Stop wearing your lenses and call our office if redness, blurred vision, or pain persists more than 12 hours.  We recommend an annual eye exam for contact lens patients.    Discussed and educated patient on current findings /plan.  RTC 1 year, prn if any changes / issues

## 2024-03-07 NOTE — PATIENT INSTRUCTIONS
"DRY EYES -- BURNING OR THEODORE SYMPTOMS:  Use Over The Counter artificial tears as needed for dry eye symptoms.   Some common brands include:  Systane, Optive, Refresh, and Thera-Tears.  These drops can be used as frequently as desired, but may be most helpful use during long periods of concentrated work.  For example, reading / working at the computer. Start with 3-4x per day.     Nighttime Ophthalmic gel or ointments are available: Refresh PM, Genteal, and Lacrilube.    Avoid drops that "get redness out" (Visine, Murine, Clear Eyes), as these may contain medication that could further irritate the eyes, especially with chronic use.    ALLERGY EYES -- ITCHING SYMPTOMS:  Over the counter medications include--Pataday, Zaditor, and Alaway.  Use as directed 1-2 drops daily for symptoms of itching / watering eyes.  These drops will not help for dry eye or exposure symptoms.    REDNESS RELIEF:  Lumify---is a good redness reliever that will not cause irritation if used chronically.       FLASHES / FLOATERS / POSTERIOR VITREOUS DETACHMENT    Call the clinic if you have any further changes in symptoms.  Including:  Increased numbers of floaters or flashing lights, dimness or darkness that moves through or stays constant in your vision, or any pain in the eye (s).    You may sometimes see small specks or clouds moving in your field of vision.  They are called FLOATERS.  You can often see them when looking at a plain background, like a blank wall or blue anneliese.  Floaters are actually tiny clumps of gel or cells inside the VITREOUS, the clear jelly-like fluid that fills the inside of your eye.    While these objects look like they are in front of your eye, they are actually floating inside.  What you see are the shadows they cast on the RETINA, the nerve layer at the back of the eye that senses light and allows you to see.      POSTERIOR VITREOUS DETACHMENT    The appearance of new floaters may be alarming.  If you suddenly develop " new floaters, you should contact your eye care professional  right away.    The retina can tear if the shrinking vitreous pulls away from the wall of the eye.  This sometimes causes a small amount of bleeding in the eye that may appear as new floaters.    A torn retina is always a serious problem, since it can lead to a retinal detachment.  You should see your eye care professional as soon as possible if:    even one new floater appears suddenly;  you see sudden flashes of light;  you notice other symptoms, like the loss of side vision, or a curtain closes down in your vision        POSTERIOR VITREOUS DETACHMENT is more common for people who:    are nearsighted;  have had cataract surgery;  have had YAG laser surgery of the eye;  have had inflammation inside the eye;  are over age 60.      While some floaters may remain visible, many of them will fade over time and become less noticeable.  Even if you've had some floaters for years, you should have your eyes checked as soon as possible if you notice new ones.    FLASHING LIGHTS    When the vitreous gel rubs or pulls on the retina, you may see what look like flashing lights or lightning streaks.  These flashes can appear off and on for several weeks or months.      Some people experience flashes of light that appear as jagged lines or heat waves in both eyes, lasting 10-20 minutes.  These flashes are caused by a spasm of blood vessels in the brain, which is called a migraine.    If a headache follows these flashes, it's called a migraine headache.  If   no headache occurs, these flashes are called Ophthalmic or Ocular Migraine.          Early Cataracts--not visually significant for surgery consultation.    What Are Cataracts?  A clear lens in the eye focuses light. This lets the eye see images sharply. With age, the lens slowly becomes cloudy. The cloudy lens is a cataract. A cataract scatters light and makes it hard for the eye to focus. Cataracts often form in  both eyes. But one lens may cloud faster than the other.      The Aging of Your Lens    Your lens may cloud so slowly that you don`t notice any vision changes at first. But as the cataract gets worse, the eye has a harder time focusing. In early stages, glasses may help you see better. As the lens gets cloudier, your doctor may recommend surgery to restore your vision.      DAILY WEAR CONTACT LENSES  Continue with Daily Wear of contact lenses, up to all waking hours.  Continue with approved contact lens disinfection / rewetting drops as discussed.  Dispose of lenses daily   Stop wearing your lenses and call our office if redness, blurred vision, or pain persists more than 12 hours.  We recommend an annual eye exam for contact lens patients.

## 2024-03-14 ENCOUNTER — PATIENT MESSAGE (OUTPATIENT)
Dept: OPTOMETRY | Facility: CLINIC | Age: 64
End: 2024-03-14
Payer: MEDICARE

## 2024-07-22 ENCOUNTER — PATIENT MESSAGE (OUTPATIENT)
Dept: OPTOMETRY | Facility: CLINIC | Age: 64
End: 2024-07-22
Payer: MEDICARE

## 2025-05-28 ENCOUNTER — OFFICE VISIT (OUTPATIENT)
Dept: OPTOMETRY | Facility: CLINIC | Age: 65
End: 2025-05-28
Payer: MEDICARE

## 2025-05-28 DIAGNOSIS — H25.13 NUCLEAR SCLEROSIS, BILATERAL: Primary | ICD-10-CM

## 2025-05-28 DIAGNOSIS — Z13.5 GLAUCOMA SCREENING: ICD-10-CM

## 2025-05-28 DIAGNOSIS — H17.9 CORNEAL SCAR, LEFT EYE: ICD-10-CM

## 2025-05-28 DIAGNOSIS — Z46.0 CONTACT LENS/GLASSES FITTING: ICD-10-CM

## 2025-05-28 DIAGNOSIS — H43.393 VITREOUS FLOATERS, BILATERAL: ICD-10-CM

## 2025-05-28 DIAGNOSIS — H52.03 HYPEROPIA WITH ASTIGMATISM AND PRESBYOPIA, BILATERAL: ICD-10-CM

## 2025-05-28 DIAGNOSIS — Z46.0 CONTACT LENS/GLASSES FITTING: Primary | ICD-10-CM

## 2025-05-28 DIAGNOSIS — H52.4 HYPEROPIA WITH ASTIGMATISM AND PRESBYOPIA, BILATERAL: ICD-10-CM

## 2025-05-28 DIAGNOSIS — H52.203 HYPEROPIA WITH ASTIGMATISM AND PRESBYOPIA, BILATERAL: ICD-10-CM

## 2025-05-28 PROCEDURE — 99499 UNLISTED E&M SERVICE: CPT | Mod: ,,, | Performed by: OPTOMETRIST

## 2025-05-28 PROCEDURE — 92250 FUNDUS PHOTOGRAPHY W/I&R: CPT | Mod: PBBFAC,PO | Performed by: OPTOMETRIST

## 2025-05-28 PROCEDURE — 92014 COMPRE OPH EXAM EST PT 1/>: CPT | Mod: S$PBB,,, | Performed by: OPTOMETRIST

## 2025-05-28 PROCEDURE — 99212 OFFICE O/P EST SF 10 MIN: CPT | Mod: PBBFAC,PO | Performed by: OPTOMETRIST

## 2025-05-28 PROCEDURE — 99999 PR PBB SHADOW E&M-EST. PATIENT-LVL II: CPT | Mod: PBBFAC,,, | Performed by: OPTOMETRIST

## 2025-05-28 PROCEDURE — 92310 CONTACT LENS FITTING OU: CPT | Mod: CSM,,, | Performed by: OPTOMETRIST

## 2025-05-28 NOTE — PATIENT INSTRUCTIONS
"DRY EYES -- BURNING OR THEODORE SYMPTOMS:  Use Over The Counter artificial tears as needed for dry eye symptoms.   Some common brands include:  Systane, Optive, Refresh, and Thera-Tears.  These drops can be used as frequently as desired, but may be most helpful use during long periods of concentrated work.  For example, reading / working at the computer. Start with 3-4x per day.     Nighttime Ophthalmic gel or ointments are available: Refresh PM, Genteal, and Lacrilube.    Avoid drops that "get redness out" (Visine, Murine, Clear Eyes), as these may contain medication that could further irritate the eyes, especially with chronic use.    ALLERGY EYES -- ITCHING SYMPTOMS:  Over the counter medications include--Pataday, Zaditor, and Alaway.  Use as directed 1-2 drops daily for symptoms of itching / watering eyes.  These drops will not help for dry eye or exposure symptoms.    REDNESS RELIEF:  Lumify---is a good redness reliever that will not cause irritation if used chronically.        FLASHES / FLOATERS / POSTERIOR VITREOUS DETACHMENT    Call the clinic if you have any further changes in symptoms.  Including:  Increased numbers of floaters or flashing lights, dimness or darkness that moves through or stays constant in your vision, or any pain in the eye (s).    You may sometimes see small specks or clouds moving in your field of vision.  They are called FLOATERS.  You can often see them when looking at a plain background, like a blank wall or blue anneliese.  Floaters are actually tiny clumps of gel or cells inside the VITREOUS, the clear jelly-like fluid that fills the inside of your eye.    While these objects look like they are in front of your eye, they are actually floating inside.  What you see are the shadows they cast on the RETINA, the nerve layer at the back of the eye that senses light and allows you to see.      POSTERIOR VITREOUS DETACHMENT    The appearance of new floaters may be alarming.  If you suddenly " develop new floaters, you should contact your eye care professional  right away.    The retina can tear if the shrinking vitreous pulls away from the wall of the eye.  This sometimes causes a small amount of bleeding in the eye that may appear as new floaters.    A torn retina is always a serious problem, since it can lead to a retinal detachment.  You should see your eye care professional as soon as possible if:    even one new floater appears suddenly;  you see sudden flashes of light;  you notice other symptoms, like the loss of side vision, or a curtain closes down in your vision        POSTERIOR VITREOUS DETACHMENT is more common for people who:    are nearsighted;  have had cataract surgery;  have had YAG laser surgery of the eye;  have had inflammation inside the eye;  are over age 60.      While some floaters may remain visible, many of them will fade over time and become less noticeable.  Even if you've had some floaters for years, you should have your eyes checked as soon as possible if you notice new ones.    FLASHING LIGHTS    When the vitreous gel rubs or pulls on the retina, you may see what look like flashing lights or lightning streaks.  These flashes can appear off and on for several weeks or months.      Some people experience flashes of light that appear as jagged lines or heat waves in both eyes, lasting 10-20 minutes.  These flashes are caused by a spasm of blood vessels in the brain, which is called a migraine.    If a headache follows these flashes, it's called a migraine headache.  If   no headache occurs, these flashes are called Ophthalmic or Ocular Migraine.           Early Cataracts--not visually significant for surgery consultation.    What Are Cataracts?  A clear lens in the eye focuses light. This lets the eye see images sharply. With age, the lens slowly becomes cloudy. The cloudy lens is a cataract. A cataract scatters light and makes it hard for the eye to focus. Cataracts often  form in both eyes. But one lens may cloud faster than the other.      The Aging of Your Lens    Your lens may cloud so slowly that you don`t notice any vision changes at first. But as the cataract gets worse, the eye has a harder time focusing. In early stages, glasses may help you see better. As the lens gets cloudier, your doctor may recommend surgery to restore your vision.       DAILY WEAR CONTACT LENSES  Continue with Daily Wear of contact lenses, up to all waking hours.  Continue with approved contact lens disinfection / rewetting drops as discussed.  Dispose of lenses daily  Stop wearing your lenses and call our office if redness, blurred vision, or pain persists more than 12 hours.  We recommend an annual eye exam for contact lens patients.

## 2025-05-28 NOTE — PROGRESS NOTES
HPI    Routine eye exam-dle-3/24    Pt denies any change since last visit. Denies any flashes or floaters.   Needing updated glasses and clrx. AT prn.   Last edited by Trice Parks on 5/28/2025  9:26 AM.            Assessment /Plan     For exam results, see Encounter Report.    Nuclear sclerosis, bilateral    Vitreous floaters, bilateral  -     Color Fundus Photography - OU - Both Eyes    Corneal scar, left eye  -     Color Fundus Photography - OU - Both Eyes    Glaucoma screening    Hyperopia with astigmatism and presbyopia, bilateral    Contact lens/glasses fitting      Early vis sig NS cataracts, OU. Not ready for consult, gave info, cautions driving especially at night. CE possible in   RD precautions given and reviewed. Patient knows to call/ message if any further changes in symptoms occur.  ---defers DFE --OPTOS 5/2025   Longstanding  // not grossly vis sig   Not suspect   Updated specs rx gave copy, discussed options // fill prn --ok continue with current   Updated clrx, gave copy w/ no changes, happy w/ last increase ---monovision w/ OD near     DAILY WEAR CONTACT LENSES  Continue with Daily Wear of contact lenses, up to all waking hours.  Continue with approved contact lens disinfection / rewetting drops as discussed.  Dispose of lenses daily.   Stop wearing your lenses and call our office if redness, blurred vision, or pain persists more than 12 hours.  We recommend an annual eye exam for contact lens patients.    Discussed and educated patient on current findings /plan.  RTC 1 year, prn if any changes / issues

## 2025-06-18 ENCOUNTER — PATIENT MESSAGE (OUTPATIENT)
Dept: OPTOMETRY | Facility: CLINIC | Age: 65
End: 2025-06-18
Payer: MEDICARE